# Patient Record
Sex: FEMALE | URBAN - METROPOLITAN AREA
[De-identification: names, ages, dates, MRNs, and addresses within clinical notes are randomized per-mention and may not be internally consistent; named-entity substitution may affect disease eponyms.]

---

## 2021-11-01 NOTE — IMPRESSION/PLAN
Impression: S/P [facility] LASIK OU - 73 Days. Encounter for surgical aftercare following surgery on a sense organ  Z48.810. Plan: Celia Jimenez, continue artificial tears RTC in 3 months --Continue all meds

## 2021-11-12 NOTE — IMPRESSION/PLAN
Impression: S/P [facility] LASIK OU - 84 Days. Encounter for surgical aftercare following surgery on a sense organ  Z48.810. Plan: Recommended artificial tears, 4x/day. Discussed proper hydration of body. Sunglasses will help with wind exposure. Patient using preservative AT, every hr. We recommend NP AT and culluvisc. Sample of alrex, bid, prn. [facility] today.

## 2022-10-31 ENCOUNTER — OFFICE VISIT (OUTPATIENT)
Dept: URBAN - METROPOLITAN AREA CLINIC 21 | Facility: CLINIC | Age: 37
End: 2022-10-31
Payer: COMMERCIAL

## 2022-10-31 DIAGNOSIS — G43.B0 OPHTHALMOPLEGIC MIGRAINE, NOT INTRACTABLE: Primary | ICD-10-CM

## 2022-10-31 PROCEDURE — 99213 OFFICE O/P EST LOW 20 MIN: CPT | Performed by: OPHTHALMOLOGY

## 2022-10-31 ASSESSMENT — INTRAOCULAR PRESSURE
OD: 13
OS: 13

## 2022-10-31 NOTE — IMPRESSION/PLAN
Impression: Ophthalmoplegic migraine, not intractable: G43. B0. Plan: Advised/Discussed patient of condition. Continue to monitor. Patient to take OTC pain medication when ocular migraine begins to control migraine that follows. Continue care with PCP, patient states she has a CT scheduled.